# Patient Record
Sex: FEMALE | Race: ASIAN | NOT HISPANIC OR LATINO | Employment: PART TIME | URBAN - METROPOLITAN AREA
[De-identification: names, ages, dates, MRNs, and addresses within clinical notes are randomized per-mention and may not be internally consistent; named-entity substitution may affect disease eponyms.]

---

## 2022-02-07 ENCOUNTER — TELEMEDICINE (OUTPATIENT)
Dept: FAMILY MEDICINE CLINIC | Facility: TELEHEALTH | Age: 22
End: 2022-02-07

## 2022-02-07 ENCOUNTER — LAB (OUTPATIENT)
Dept: LAB | Facility: HOSPITAL | Age: 22
End: 2022-02-07

## 2022-02-07 DIAGNOSIS — Z20.822 SUSPECTED COVID-19 VIRUS INFECTION: Primary | ICD-10-CM

## 2022-02-07 DIAGNOSIS — Z20.822 SUSPECTED COVID-19 VIRUS INFECTION: ICD-10-CM

## 2022-02-07 LAB — SARS-COV-2 RNA NOSE QL NAA+PROBE: DETECTED

## 2022-02-07 PROCEDURE — U0004 COV-19 TEST NON-CDC HGH THRU: HCPCS

## 2022-02-07 PROCEDURE — BHEMPVIDEOVISIT: Performed by: NURSE PRACTITIONER

## 2022-02-07 NOTE — PROGRESS NOTES
Mode of Visit: Video  Location of patient: home  You have chosen to receive care through a telehealth visit.  The patient has signed the video visit consent form.  The visit included audio and video interaction. No technical issues occurred during this visit.     Chief Complaint  Exposure To Known Illness (Sister had rapid At-home positive), Chills (last night), Hoarse, and Cough    Subjective          Mila Lechuga presents to [facility]  [employer] Employee with symptoms of Covid has reported to screening and instructed to schedule a Virtual Visit for evaluation and Covid testing Order.   Symptom Onset: 2/6/2022    Chills  This is a new problem. The current episode started yesterday. Associated symptoms include chills, coughing and weakness.   Hoarse  This is a new problem. The current episode started yesterday. Associated symptoms include chills, coughing and weakness.   Cough  Associated symptoms include chills.     Objective   Vital Signs:   There were no vitals taken for this visit.    Virtual Visit Physical Exam  Result Review :                 Assessment and Plan {CC Problem List  Visit Diagnosis   ROS  Review (Popup)  Health Maintenance  Quality  BestPractice  Medications  SmartSets  SnapShot Encounters  Media :23}   Diagnoses and all orders for this visit:    1. Suspected COVID-19 virus infection (Primary)  -     COVID-19 PCR, [facility], NP SWAB IN [facility]SP[facility] MEDIA/ORAL SWISH 24-30 HR TAT - Swab, Nasopharynx; Future              I spent 20 minutes caring for Mila on this date of service. This time includes time spent by me in the following activities:preparing for the visit, obtaining and/or reviewing a separately obtained history, performing a medically appropriate examination and/or evaluation , counseling and educating the patient/family/caregiver, ordering medications, tests, or procedures, and documenting information in the medical record  Follow Up    No follow-ups on file.  Patient was given instructions and counseling regarding her condition or for health maintenance advice. Please see specific information pulled into the AVS if appropriate.

## 2022-02-07 NOTE — PATIENT INSTRUCTIONS
Order has been placed for SARS-CoV-2 (Coronavirus) test to be done at your nearest [facility] facility. You don't need to call the Urgent Care, just let them know when you arrive that you have been assessed by a Virtual Care Provider and that you have an order for testing. We will call with results when they are available. The results will be released to your RallyOn zachary. A RallyOn message will also be sent after the first attempted call to notify you that your test results are available. Continue to treat your symptoms just as you would for any viral illness. Take Tylenol and/or Ibuprofen for pain and/or fever, you may find it better to alternate these every 4 hours, so that you are only taking each every 8 hours. Stay hydrated, rest and you may treat cough with over-the-counter cough syrup such as Robitussin. You will need to Self Quarantine until test results are back. Follow EH guidelines.  [employer] Employees who need to report an exposure to Covid-19, symptoms, test results or to seek clearance to return to work, should use the Self-Reporting Form on [employer]. The link to this site is:   [email or web address]     This form is located on [email or web address] under [facility]. All instructions for employees including what to do next are on this site. Scan the QRS code with your phone to access the Employee Covid-19 Exposure Survey:Initial Report, Test Results and RTW zachary.   If you do not have the ability to scan the code, there is a link on the page to access the form.     Using the form instead of calling [employer] will speed up the process and provide employees with instructions. To avoid missed calls from [employer], add this number to your phone's Contact list: [phone].

## 2024-08-26 ENCOUNTER — HOSPITAL ENCOUNTER (EMERGENCY)
Facility: HOSPITAL | Age: 24
Discharge: HOME | End: 2024-08-26
Attending: EMERGENCY MEDICINE | Admitting: EMERGENCY MEDICINE
Payer: MEDICAID

## 2024-08-26 VITALS
WEIGHT: 95 LBS | TEMPERATURE: 99 F | HEART RATE: 78 BPM | OXYGEN SATURATION: 99 % | DIASTOLIC BLOOD PRESSURE: 65 MMHG | HEIGHT: 59 IN | BODY MASS INDEX: 19.15 KG/M2 | SYSTOLIC BLOOD PRESSURE: 108 MMHG | RESPIRATION RATE: 16 BRPM

## 2024-08-26 DIAGNOSIS — N64.52 BREAST DISCHARGE: Primary | ICD-10-CM

## 2024-08-26 LAB
ANION GAP SERPL CALCULATED.3IONS-SCNC: 9 MMOL/L (ref 5–15)
BASOPHILS # BLD AUTO: 0.03 10*3/MM3 (ref 0–0.2)
BASOPHILS NFR BLD AUTO: 0.6 % (ref 0–1.5)
BUN SERPL-MCNC: 20 MG/DL (ref 6–20)
BUN/CREAT SERPL: 23 (ref 7–25)
CALCIUM SPEC-SCNC: 9.1 MG/DL (ref 8.6–10.5)
CHLORIDE SERPL-SCNC: 105 MMOL/L (ref 98–107)
CO2 SERPL-SCNC: 24 MMOL/L (ref 22–29)
CREAT SERPL-MCNC: 0.87 MG/DL (ref 0.57–1)
D-LACTATE SERPL-SCNC: 1.3 MMOL/L (ref 0.5–2)
DEPRECATED RDW RBC AUTO: 39.6 FL (ref 37–54)
EGFRCR SERPLBLD CKD-EPI 2021: 96.1 ML/MIN/1.73
EOSINOPHIL # BLD AUTO: 0.15 10*3/MM3 (ref 0–0.4)
EOSINOPHIL NFR BLD AUTO: 3 % (ref 0.3–6.2)
ERYTHROCYTE [DISTWIDTH] IN BLOOD BY AUTOMATED COUNT: 12.2 % (ref 12.3–15.4)
GLUCOSE SERPL-MCNC: 88 MG/DL (ref 65–99)
HCT VFR BLD AUTO: 39.7 % (ref 34–46.6)
HGB BLD-MCNC: 12.8 G/DL (ref 12–15.9)
IMM GRANULOCYTES # BLD AUTO: 0.02 10*3/MM3 (ref 0–0.05)
IMM GRANULOCYTES NFR BLD AUTO: 0.4 % (ref 0–0.5)
LYMPHOCYTES # BLD AUTO: 1.74 10*3/MM3 (ref 0.7–3.1)
LYMPHOCYTES NFR BLD AUTO: 35.2 % (ref 19.6–45.3)
MCH RBC QN AUTO: 28.6 PG (ref 26.6–33)
MCHC RBC AUTO-ENTMCNC: 32.2 G/DL (ref 31.5–35.7)
MCV RBC AUTO: 88.6 FL (ref 79–97)
MONOCYTES # BLD AUTO: 0.38 10*3/MM3 (ref 0.1–0.9)
MONOCYTES NFR BLD AUTO: 7.7 % (ref 5–12)
NEUTROPHILS NFR BLD AUTO: 2.63 10*3/MM3 (ref 1.7–7)
NEUTROPHILS NFR BLD AUTO: 53.1 % (ref 42.7–76)
NRBC BLD AUTO-RTO: 0 /100 WBC (ref 0–0.2)
PLATELET # BLD AUTO: 211 10*3/MM3 (ref 140–450)
PMV BLD AUTO: 10.5 FL (ref 6–12)
POTASSIUM SERPL-SCNC: 3.7 MMOL/L (ref 3.5–5.2)
RBC # BLD AUTO: 4.48 10*6/MM3 (ref 3.77–5.28)
SODIUM SERPL-SCNC: 138 MMOL/L (ref 136–145)
WBC NRBC COR # BLD AUTO: 4.95 10*3/MM3 (ref 3.4–10.8)

## 2024-08-26 PROCEDURE — 85025 COMPLETE CBC W/AUTO DIFF WBC: CPT | Performed by: PHYSICIAN ASSISTANT

## 2024-08-26 PROCEDURE — 80048 BASIC METABOLIC PNL TOTAL CA: CPT | Performed by: PHYSICIAN ASSISTANT

## 2024-08-26 PROCEDURE — 83605 ASSAY OF LACTIC ACID: CPT | Performed by: PHYSICIAN ASSISTANT

## 2024-08-26 PROCEDURE — 36415 COLL VENOUS BLD VENIPUNCTURE: CPT

## 2024-08-26 PROCEDURE — 99283 EMERGENCY DEPT VISIT LOW MDM: CPT

## 2024-08-26 RX ORDER — CEPHALEXIN 250 MG/5ML
500 POWDER, FOR SUSPENSION ORAL 4 TIMES DAILY
COMMUNITY
Start: 2024-08-16 | End: 2024-08-30

## 2024-08-26 RX ORDER — PANTOPRAZOLE SODIUM 40 MG/1
40 TABLET, DELAYED RELEASE ORAL DAILY
COMMUNITY
Start: 2024-07-12

## 2024-08-26 NOTE — ED PROVIDER NOTES
EMERGENCY DEPARTMENT ENCOUNTER  Room Number:  41/41  PCP: Provider, No Known  Independent Historians: Patient      HPI:  Chief Complaint: had concerns including Wound Check and Breast Discharge.     A complete HPI/ROS/PMH/PSH/SH/FH are unobtainable due to: None    Chronic or social conditions impacting patient care (Social Determinants of Health): None      Context: Mila Lechuga is a 23 y.o. female with a medical history of GERD, thoracic scoliosis, who presents to the ED c/o acute left breast infection.  Patient reports she first developed an infection 2 years ago.  She reports she has had on and off infections.  She reports that her current infection started at the end of June.  She has seen her primary care provider for this infection.  She is scheduled to see a breast specialist in 1 month.  She was initially empirically placed on clindamycin and was switched to doxycycline.  She had wound culture that grew out Staph epidermidis.  She subsequently had breast ultrasound that ruled out lesion or abscess.  She recently followed up with a different primary provider and was placed on Keflex.  She was told if her symptoms did not improve she needed to go to the emergency department.  Patient denies fever, nausea, vomiting.  No right breast symptoms.  Patient is concerned that she has Paget's disease.  Patient has no other systemic complaints at this time.      Review of prior external notes (non-ED) -and- Review of prior external test results outside of this encounter:  Patient seen in office by PCP on 8/16/2024 for nipple infection.  Reviewed assessment and plan.  Patient prescribed Keflex 4 times daily for 14 days.    Prescription drug monitoring program review:     N/A    PAST MEDICAL HISTORY  Active Ambulatory Problems     Diagnosis Date Noted    No Active Ambulatory Problems     Resolved Ambulatory Problems     Diagnosis Date Noted    No Resolved Ambulatory Problems     No Additional Past Medical History          PAST SURGICAL HISTORY  History reviewed. No pertinent surgical history.      FAMILY HISTORY  History reviewed. No pertinent family history.      SOCIAL HISTORY  Social History     Socioeconomic History    Marital status: Single         ALLERGIES  Patient has no known allergies.      REVIEW OF SYSTEMS  Review of Systems   Constitutional:  Negative for chills and fever.   HENT:  Negative for ear pain and sore throat.    Respiratory:  Negative for cough and shortness of breath.    Cardiovascular:  Negative for chest pain and palpitations.   Gastrointestinal:  Negative for abdominal pain and vomiting.   Genitourinary:  Negative for dysuria and hematuria.   Musculoskeletal:  Negative for arthralgias and joint swelling.   Skin:  Negative for pallor and rash.        Positive for left breast infection   Neurological:  Negative for numbness and headaches.   Psychiatric/Behavioral:  Negative for confusion and hallucinations.      Included in HPI  All systems reviewed and negative except for those discussed in HPI.      PHYSICAL EXAM    I have reviewed the triage vital signs and nursing notes.    ED Triage Vitals   Temp Heart Rate Resp BP SpO2   08/26/24 1059 08/26/24 1059 08/26/24 1059 08/26/24 1101 08/26/24 1059   99 °F (37.2 °C) 114 16 122/82 96 %      Temp src Heart Rate Source Patient Position BP Location FiO2 (%)   -- -- -- -- --              Physical Exam  Constitutional:       General: She is not in acute distress.     Appearance: She is well-developed.   HENT:      Head: Normocephalic and atraumatic.   Eyes:      Extraocular Movements: Extraocular movements intact.   Cardiovascular:      Rate and Rhythm: Normal rate and regular rhythm.      Heart sounds: Normal heart sounds.   Pulmonary:      Effort: Pulmonary effort is normal.      Breath sounds: Normal breath sounds.   Chest:      Comments: Breast exam chaperoned by MUMTAZ Atkins.  There is no breast warmth or erythema.  There is very scant, if any, using  serous appearing drainage from the left nipple.  No palpable masses or fluctuance.  Abdominal:      General: There is no distension.   Skin:     General: Skin is warm.   Neurological:      General: No focal deficit present.      Mental Status: She is alert and oriented to person, place, and time.   Psychiatric:         Mood and Affect: Mood normal.           LAB RESULTS  Recent Results (from the past 24 hour(s))   Basic Metabolic Panel    Collection Time: 08/26/24 11:48 AM    Specimen: Blood   Result Value Ref Range    Glucose 88 65 - 99 mg/dL    BUN 20 6 - 20 mg/dL    Creatinine 0.87 0.57 - 1.00 mg/dL    Sodium 138 136 - 145 mmol/L    Potassium 3.7 3.5 - 5.2 mmol/L    Chloride 105 98 - 107 mmol/L    CO2 24.0 22.0 - 29.0 mmol/L    Calcium 9.1 8.6 - 10.5 mg/dL    BUN/Creatinine Ratio 23.0 7.0 - 25.0    Anion Gap 9.0 5.0 - 15.0 mmol/L    eGFR 96.1 >60.0 mL/min/1.73   Lactic Acid, Plasma    Collection Time: 08/26/24 11:48 AM    Specimen: Blood   Result Value Ref Range    Lactate 1.3 0.5 - 2.0 mmol/L   CBC Auto Differential    Collection Time: 08/26/24 11:48 AM    Specimen: Blood   Result Value Ref Range    WBC 4.95 3.40 - 10.80 10*3/mm3    RBC 4.48 3.77 - 5.28 10*6/mm3    Hemoglobin 12.8 12.0 - 15.9 g/dL    Hematocrit 39.7 34.0 - 46.6 %    MCV 88.6 79.0 - 97.0 fL    MCH 28.6 26.6 - 33.0 pg    MCHC 32.2 31.5 - 35.7 g/dL    RDW 12.2 (L) 12.3 - 15.4 %    RDW-SD 39.6 37.0 - 54.0 fl    MPV 10.5 6.0 - 12.0 fL    Platelets 211 140 - 450 10*3/mm3    Neutrophil % 53.1 42.7 - 76.0 %    Lymphocyte % 35.2 19.6 - 45.3 %    Monocyte % 7.7 5.0 - 12.0 %    Eosinophil % 3.0 0.3 - 6.2 %    Basophil % 0.6 0.0 - 1.5 %    Immature Grans % 0.4 0.0 - 0.5 %    Neutrophils, Absolute 2.63 1.70 - 7.00 10*3/mm3    Lymphocytes, Absolute 1.74 0.70 - 3.10 10*3/mm3    Monocytes, Absolute 0.38 0.10 - 0.90 10*3/mm3    Eosinophils, Absolute 0.15 0.00 - 0.40 10*3/mm3    Basophils, Absolute 0.03 0.00 - 0.20 10*3/mm3    Immature Grans, Absolute 0.02 0.00 -  0.05 10*3/mm3    nRBC 0.0 0.0 - 0.2 /100 WBC         ORDERS PLACED DURING THIS VISIT:  Orders Placed This Encounter   Procedures    Basic Metabolic Panel    Lactic Acid, Plasma    CBC Auto Differential    Ambulatory Referral to Breast Surgery    CBC & Differential         OUTPATIENT MEDICATION MANAGEMENT:  No current Epic-ordered facility-administered medications on file.     Current Outpatient Medications Ordered in Epic   Medication Sig Dispense Refill    cephALEXin (KEFLEX) 250 MG/5ML suspension Take 10 mL by mouth 4 (Four) Times a Day.      pantoprazole (PROTONIX) 40 MG EC tablet Take 1 tablet by mouth Daily.      fluticasone (VERAMYST) 27.5 MCG/SPRAY nasal spray 2 sprays into the nostril(s) as directed by provider Daily.             PROGRESS, DATA ANALYSIS, CONSULTS, AND MEDICAL DECISION MAKING  All labs have been independently interpreted by me.  All radiology studies have been reviewed by me. All EKG's have been independently viewed and interpreted by me.  Discussion below represents my analysis of pertinent findings related to patient's condition, differential diagnosis, treatment plan and final disposition.    Differential diagnosis includes but is not limited to hyperprolactinemia, cellulitis, breast abscess, sepsis.        ED Course as of 08/26/24 1904   Mon Aug 26, 2024   1234 WBC: 4.95 [MP]   1234 Hemoglobin: 12.8 [MP]   1234 BUN: 20 [MP]   1234 Creatinine: 0.87 [MP]   1234 Lactate: 1.3 [MP]   1903 Patient presents to emergency department with concern for left nipple infection.  Worked up with labs, does not have elevated white blood cell count or lactic acid, lowers concern for sepsis.  Patient is scheduled to follow-up with the breast specialist in 1 month.  Discussed ED return precautions.  She is otherwise well-appearing, hemodynamically stable, and therefore appropriate for discharge. [MP]      ED Course User Index  [MP] Brandy Horta PA-C             AS OF 19:02 EDT VITALS:    BP - 108/65  HR  - 78  TEMP - 99 °F (37.2 °C)  O2 SATS - 99%    COMPLEXITY OF CARE  Admission was considered but after careful review of the patient's presentation, physical examination, diagnostic results, and response to treatment the patient may be safely discharged with outpatient follow-up.      DIAGNOSIS  Final diagnoses:   Breast discharge         DISPOSITION  ED Disposition       ED Disposition   Discharge    Condition   Stable    Comment   --                Please note that portions of this document were completed with a voice recognition program.    Note Disclaimer: At [facility], we believe that sharing information builds trust and better relationships. You are receiving this note because you recently visited [facility]. It is possible you will see health information before a provider has talked with you about it. This kind of information can be easy to misunderstand. To help you fully understand what it means for your health, we urge you to discuss this note with your provider.         Brandy Horta PA-C  08/26/24 190

## 2024-08-26 NOTE — ED PROVIDER NOTES
MD ATTESTATION NOTE    SHARED VISIT: This visit was performed by BOTH a physician and an APC. The substantive portion of the medical decision making was performed by this attesting physician who made or approved the management plan and takes responsibility for patient management. All studies in the APC note (if performed) were independently interpreted by me.     The TICO and I have discussed this patient's history, physical exam, and treatment plan.  I have reviewed the documentation and affirm the documentation and agree with the treatment and plan.  The attached note describes my personal findings.      Independent Historians: Patient    A complete HPI/ROS/PMH/PSH/SH/FH are unobtainable due to: None      Chronic or social conditions impacting patient care (social determinants of health): None    Mila Lechuga is a 23 y.o. female who presents to the ED c/o acute left breast infection.  Patient with recurrent infections off and on antibiotics.  Patient said she started with some drainage and tenderness at the end of June.  Patient scheduled to see a breast specialist at  in 1 month.  Patient empirically on clindamycin and then switched to doxycycline.  Patient with no fever or vomiting.          On exam:  GENERAL: Pleasant cooperative well-appearing female, alert, no acute distress  SKIN: Warm, dry, left breast with no warmth or erythema, very small amount of serous drainage from the left nipple, no mass palpable  HENT: Normocephalic, atraumatic                                                             Labs  Recent Results (from the past 24 hour(s))   CBC Auto Differential    Collection Time: 08/26/24 11:48 AM    Specimen: Blood   Result Value Ref Range    WBC 4.95 3.40 - 10.80 10*3/mm3    RBC 4.48 3.77 - 5.28 10*6/mm3    Hemoglobin 12.8 12.0 - 15.9 g/dL    Hematocrit 39.7 34.0 - 46.6 %    MCV 88.6 79.0 - 97.0 fL    MCH 28.6 26.6 - 33.0 pg    MCHC 32.2 31.5 - 35.7 g/dL    RDW 12.2 (L) 12.3 - 15.4 %    RDW-SD 39.6  37.0 - 54.0 fl    MPV 10.5 6.0 - 12.0 fL    Platelets 211 140 - 450 10*3/mm3    Neutrophil % 53.1 42.7 - 76.0 %    Lymphocyte % 35.2 19.6 - 45.3 %    Monocyte % 7.7 5.0 - 12.0 %    Eosinophil % 3.0 0.3 - 6.2 %    Basophil % 0.6 0.0 - 1.5 %    Immature Grans % 0.4 0.0 - 0.5 %    Neutrophils, Absolute 2.63 1.70 - 7.00 10*3/mm3    Lymphocytes, Absolute 1.74 0.70 - 3.10 10*3/mm3    Monocytes, Absolute 0.38 0.10 - 0.90 10*3/mm3    Eosinophils, Absolute 0.15 0.00 - 0.40 10*3/mm3    Basophils, Absolute 0.03 0.00 - 0.20 10*3/mm3    Immature Grans, Absolute 0.02 0.00 - 0.05 10*3/mm3    nRBC 0.0 0.0 - 0.2 /100 WBC       Radiology  No Radiology Exams Resulted Within Past 24 Hours    Medical Decision Making:  ED Course as of 08/29/24 1515   Mon Aug 26, 2024   1234 WBC: 4.95 [MP]   1234 Hemoglobin: 12.8 [MP]   1234 BUN: 20 [MP]   1234 Creatinine: 0.87 [MP]   1234 Lactate: 1.3 [MP]   1903 Patient presents to emergency department with concern for left nipple infection.  Worked up with labs, does not have elevated white blood cell count or lactic acid, lowers concern for sepsis.  Patient is scheduled to follow-up with the breast specialist in 1 month.  Discussed ED return precautions.  She is otherwise well-appearing, hemodynamically stable, and therefore appropriate for discharge. [MP]      ED Course User Index  [MP] Brandy Horta PA-C       MDM: Abscess, cellulitis, mass, seroma, malignancy--among other possibilities.  Patient already established with a breast specialist.    Procedures:  Procedures        PPE: I followed hospital protocols for proper PPE based on patient presentation including use of N95 mask for suspected infectious respiratory conditions.  Proper hand hygiene was performed both before and after the patient encounter.          Diagnosis  Final diagnoses:   None   Breast nipple discharge    Dispo: Discharge    Note Disclaimer: At [facility], we believe that sharing information builds trust and better  relationships. You are receiving this note because you recently visited [facility]. It is possible you will see health information before a provider has talked with you about it. This kind of information can be easy to misunderstand. To help you fully understand what it means for your health, we urge you to discuss this note with your provider.       Yanet Walton MD  08/29/24 [id number]

## 2024-08-26 NOTE — DISCHARGE INSTRUCTIONS
Follow-up with your primary care provider.  I have placed a referral to breast surgery for follow-up of your nipple discharge.  Return to emergency department for any worsening symptoms.

## 2024-08-27 ENCOUNTER — TELEPHONE (OUTPATIENT)
Dept: SURGERY | Facility: CLINIC | Age: 24
End: 2024-08-27
Payer: MEDICAID

## 2024-09-06 ENCOUNTER — TELEPHONE (OUTPATIENT)
Dept: SURGERY | Facility: CLINIC | Age: 24
End: 2024-09-06
Payer: MEDICAID

## 2024-09-06 NOTE — TELEPHONE ENCOUNTER
Called and spoke with patient asked if she is going to the [facility] or would still like to be seen in our office. She would like to be seen by us and also [facility] depending on location she lives in [place] but is also seen at .     [place]    
Called pt to schedule, she was busy and requested to call back to make an appointment/ [place]  
no

## 2024-09-11 ENCOUNTER — OFFICE VISIT (OUTPATIENT)
Dept: SURGERY | Facility: CLINIC | Age: 24
End: 2024-09-11
Payer: MEDICAID

## 2024-09-11 VITALS
SYSTOLIC BLOOD PRESSURE: 124 MMHG | HEART RATE: 83 BPM | WEIGHT: 96 LBS | RESPIRATION RATE: 18 BRPM | BODY MASS INDEX: 19.35 KG/M2 | OXYGEN SATURATION: 98 % | HEIGHT: 59 IN | DIASTOLIC BLOOD PRESSURE: 82 MMHG

## 2024-09-11 DIAGNOSIS — N64.9 ABNORMAL NIPPLE: Primary | ICD-10-CM

## 2024-09-11 PROCEDURE — 99204 OFFICE O/P NEW MOD 45 MIN: CPT | Performed by: STUDENT IN AN ORGANIZED HEALTH CARE EDUCATION/TRAINING PROGRAM

## 2024-09-11 PROCEDURE — 1160F RVW MEDS BY RX/DR IN RCRD: CPT | Performed by: STUDENT IN AN ORGANIZED HEALTH CARE EDUCATION/TRAINING PROGRAM

## 2024-09-11 PROCEDURE — 1159F MED LIST DOCD IN RCRD: CPT | Performed by: STUDENT IN AN ORGANIZED HEALTH CARE EDUCATION/TRAINING PROGRAM

## 2024-09-11 NOTE — PROGRESS NOTES
[facility]     Referring Provider: Brandy Horta PA-C     Chief complaint:  Left nipple abnormality     Subjective   HPI: Ms. Mila Lechuga is a 23 y.o. yo woman, seen at the request of Brandy Horta PA-C, for a new diagnosis of left nipple abnormality.      Approximately 2 years ago she noted a change to her left lateral nipple.  There is an area of redness and skin ulceration in that area.  She saw multiple providers and was provided several rounds of antibiotics as well as antibiotic ointment.  There was no resolution of her symptoms.  The ulcerated area on her left nipple areolar junction waxed and waned in severity.  Over the summer she has noticed ongoing worsening pain and drainage from her left lateral nipple base.  She has gone to the emergency room for evaluation and briefly followed with wound care.  She has been applying lanolin and Xeroform gauze dressing.     She reports a pertinent PMH significant for allergies, anemia, benign tachycardia, reflux.     She denies any family history of breast or ovarian cancer.     She was by herself in clinic today.       Review of Systems   Constitutional:  Positive for appetite change (Increase) and unexpected weight change (2lbs in a month).   HENT:   Positive for lump/mass, mouth sores and tinnitus.    Eyes: Negative.    Respiratory:  Positive for shortness of breath.    Cardiovascular: Negative.    Gastrointestinal:  Positive for constipation.   Endocrine: Positive for hot flashes (too cold).   Genitourinary:  Positive for dyspareunia.    Musculoskeletal:  Positive for back pain.   Skin: Negative.    Neurological: Negative.    Hematological: Negative.    Psychiatric/Behavioral:  Positive for decreased concentration. The patient is nervous/anxious.        Medications:    Current Outpatient Medications:     fluticasone (VERAMYST) 27.5 MCG/SPRAY nasal spray, 2 sprays into the nostril(s) as directed by provider Daily., Disp: , Rfl:     pantoprazole  "(PROTONIX) 40 MG EC tablet, Take 1 tablet by mouth Daily., Disp: , Rfl:     Allergies:  Allergies   Allergen Reactions    Apple Juice Itching and Swelling     Apples    Cherry Itching and Swelling    Pear Itching and Swelling    Prunus Persica Itching and Swelling    Soy Allergy Itching    Strawberry Itching    Apple Unknown - Low Severity       Medical history:  Past Medical History:   Diagnosis Date    Anemia     Tachycardia     TMJ (dislocation of temporomandibular joint)        Surgical History:  Past Surgical History:   Procedure Laterality Date    CYST REMOVAL         Family History:  History reviewed. No pertinent family history.    Cancer Family History: Age of diagnosis/Age at death OR Age as of today  Breast Cancer: Denies  Ovarian Cancer: Denies  Pancreatic Cancer: Denies  Prostate Cancer: Denies  Colon Cancer: Denies  Lung Cancer: Denies    Social History:   Social History     Socioeconomic History    Marital status: Single   Tobacco Use    Smoking status: Never   Vaping Use    Vaping status: Never Used   Substance and Sexual Activity    Drug use: Never       She lives with her roommate.   She is a full time student.        GYNECOLOGIC HISTORY:   . P: 0. AB: 0.  Last menstrual period: 2024  Age at menarche: 12  Age at first childbirth: N/A  Lactation: N/A  Age at menopause: N/A  Total years of oral contraceptive use: Couple months   Total years of hormone replacement therapy: 0      Objective   PHYSICAL EXAMINATION  This exam was chaperoned by: Apolonia  /82   Pulse 83   Resp 18   Ht 149.9 cm (59.02\")   Wt 43.5 kg (96 lb)   LMP 2024 (Approximate)   SpO2 98%   BMI 19.38 kg/m²   ECOG 0 - Asymptomatic  General: NAD, well appearing, thin build  Psych: a&o x 3, normal mood and affect  Eyes: EOMI, no scleral icterus  ENMT: neck supple without masses or thyromegaly, mucus membranes moist  Resp: normal effort  CV: RRR, no edema   GI: soft, NT, ND  MSK: normal gait, normal ROM in bilateral " shoulders  Lymph nodes:  no cervical, supraclavicular or axillary lymphadenopathy  Breast: symmetric, small breast volume bilaterally  Right:  No visible abnormalities on inspection while seated, with arms raised or hands on hips. No masses, skin changes, or nipple abnormalities.  Left: No visible abnormalities on inspection while seated, with arms raised or hands on hips. No masses, skin changes.  Nipple papilla is larger than the right and inflamed.  Has lateral redness along the nipple.  Junction between nipple and areolar complex with crusting clear serous drainage.  No visualized granulation tissue or wound that is visible.        Imaging - documented images below have been personally reviewed:  8/2/2024 left breast ultrasound-[facility]  Focused ultrasound performed at the subareolar region around the clock demonstrates breast parenchyma without suspicious cystic or solid mass. No organized fluid collection. Comparison sonographic evaluation of the contralateral (right) nipple shows no significant disparity in tissue echogenicity. The left nipple is slightly more prominent when compared with the right and demonstrates some vascularity which could be related to infection/inflammation.   BI-RADS 3.     Pathology:  na    Assessment & Plan   Assessment:  23 y.o. F with a new diagnosis of left nipple    Discussion:  Breast imaging thus far with ultrasound has not identified an underlying etiology of her left nipple changes.    Plan:    -Recommend completion workup of her left nipple changes with breast MRI.  If breast MRI does not show any underlying abnormalities would recommend punch biopsy of the skin.  - Offered to complete this procedure during this visit however patient has a test tomorrow and declined biopsy today.  - Return to clinic after imaging complete or in 4 weeks to discuss need or role of punch biopsy      Dawn Herr MD  Breast Surgical Oncology    I spent 45 minutes caring for Mila on this  date of service. This time includes time spent by me in the following activities: preparing for the visit, reviewing tests, obtaining and/or reviewing a separately obtained history, performing a medically appropriate examination and/or evaluation , counseling and educating the patient/family/caregiver, ordering medications, tests, or procedures, referring and communicating with other health care professionals , documenting information in the medical record, independently interpreting results and communicating that information with the patient/family/caregiver, and care coordination.      CC:  Brandy Horta, Zulay Stevens MD

## 2024-09-11 NOTE — LETTER
September 11, 2024     Zulay Mac MD  [address]  [address]  [facility] [address]    Patient: Mila Lechuga   YOB: 2000   Date of Visit: 9/11/2024     Dear Zulay Mac MD:       Thank you for referring Mila Lechuga to me for evaluation. Below are the relevant portions of my assessment and plan of care.    If you have questions, please do not hesitate to call me. I look forward to following Mila along with you.         Sincerely,        Dawn Herr MD        CC: MODE Lafleur Sarah, MD  09/11/24 1618  Sign when Signing Visit  [facility]     Referring Provider: Brandy Horta PA-C     Chief complaint:  Left nipple abnormality     Subjective  HPI: Ms. Mila Lechuga is a 23 y.o. yo woman, seen at the request of Brandy Horta PA-C, for a new diagnosis of left nipple abnormality.      Approximately 2 years ago she noted a change to her left lateral nipple.  There is an area of redness and skin ulceration in that area.  She saw multiple providers and was provided several rounds of antibiotics as well as antibiotic ointment.  There was no resolution of her symptoms.  The ulcerated area on her left nipple areolar junction waxed and waned in severity.  Over the summer she has noticed ongoing worsening pain and drainage from her left lateral nipple base.  She has gone to the emergency room for evaluation and briefly followed with wound care.  She has been applying lanolin and Xeroform gauze dressing.     She reports a pertinent PMH significant for allergies, anemia, benign tachycardia, reflux.     She denies any family history of breast or ovarian cancer.     She was by herself in clinic today.       Review of Systems   Constitutional:  Positive for appetite change (Increase) and unexpected weight change (2lbs in a month).   HENT:   Positive for lump/mass, mouth sores and tinnitus.    Eyes: Negative.    Respiratory:  Positive for  shortness of breath.    Cardiovascular: Negative.    Gastrointestinal:  Positive for constipation.   Endocrine: Positive for hot flashes (too cold).   Genitourinary:  Positive for dyspareunia.    Musculoskeletal:  Positive for back pain.   Skin: Negative.    Neurological: Negative.    Hematological: Negative.    Psychiatric/Behavioral:  Positive for decreased concentration. The patient is nervous/anxious.        Medications:    Current Outpatient Medications:   •  fluticasone (VERAMYST) 27.5 MCG/SPRAY nasal spray, 2 sprays into the nostril(s) as directed by provider Daily., Disp: , Rfl:   •  pantoprazole (PROTONIX) 40 MG EC tablet, Take 1 tablet by mouth Daily., Disp: , Rfl:     Allergies:  Allergies   Allergen Reactions   • Apple Juice Itching and Swelling     Apples   • Cherry Itching and Swelling   • Pear Itching and Swelling   • Prunus Persica Itching and Swelling   • Soy Allergy Itching   • Strawberry Itching   • Apple Unknown - Low Severity       Medical history:  Past Medical History:   Diagnosis Date   • Anemia    • Tachycardia    • TMJ (dislocation of temporomandibular joint)        Surgical History:  Past Surgical History:   Procedure Laterality Date   • CYST REMOVAL         Family History:  History reviewed. No pertinent family history.    Cancer Family History: Age of diagnosis/Age at death OR Age as of today  Breast Cancer: Denies  Ovarian Cancer: Denies  Pancreatic Cancer: Denies  Prostate Cancer: Denies  Colon Cancer: Denies  Lung Cancer: Denies    Social History:   Social History     Socioeconomic History   • Marital status: Single   Tobacco Use   • Smoking status: Never   Vaping Use   • Vaping status: Never Used   Substance and Sexual Activity   • Drug use: Never       She lives with her roommate.   She is a full time student.        GYNECOLOGIC HISTORY:   . P: 0. AB: 0.  Last menstrual period: 2024  Age at menarche: 12  Age at first childbirth: N/A  Lactation: N/A  Age at menopause:  "N/A  Total years of oral contraceptive use: Couple months   Total years of hormone replacement therapy: 0      Objective  PHYSICAL EXAMINATION  This exam was chaperoned by: Apolonia  /82   Pulse 83   Resp 18   Ht 149.9 cm (59.02\")   Wt 43.5 kg (96 lb)   LMP 08/13/2024 (Approximate)   SpO2 98%   BMI 19.38 kg/m²   ECOG 0 - Asymptomatic  General: NAD, well appearing, thin build  Psych: a&o x 3, normal mood and affect  Eyes: EOMI, no scleral icterus  ENMT: neck supple without masses or thyromegaly, mucus membranes moist  Resp: normal effort  CV: RRR, no edema   GI: soft, NT, ND  MSK: normal gait, normal ROM in bilateral shoulders  Lymph nodes:  no cervical, supraclavicular or axillary lymphadenopathy  Breast: symmetric, small breast volume bilaterally  Right:  No visible abnormalities on inspection while seated, with arms raised or hands on hips. No masses, skin changes, or nipple abnormalities.  Left: No visible abnormalities on inspection while seated, with arms raised or hands on hips. No masses, skin changes.  Nipple papilla is larger than the right and inflamed.  Has lateral redness along the nipple.  Junction between nipple and areolar complex with crusting clear serous drainage.  No visualized granulation tissue or wound that is visible.        Imaging - documented images below have been personally reviewed:  8/2/2024 left breast ultrasound-[facility]  Focused ultrasound performed at the subareolar region around the clock demonstrates breast parenchyma without suspicious cystic or solid mass. No organized fluid collection. Comparison sonographic evaluation of the contralateral (right) nipple shows no significant disparity in tissue echogenicity. The left nipple is slightly more prominent when compared with the right and demonstrates some vascularity which could be related to infection/inflammation.   BI-RADS 3.     Pathology:  na    Assessment & Plan  Assessment:  23 y.o. F with a new diagnosis of left " nipple    Discussion:  Breast imaging thus far with ultrasound has not identified an underlying etiology of her left nipple changes.    Plan:    -Recommend completion workup of her left nipple changes with breast MRI.  If breast MRI does not show any underlying abnormalities would recommend punch biopsy of the skin.  - Offered to complete this procedure during this visit however patient has a test tomorrow and declined biopsy today.  - Return to clinic after imaging complete or in 4 weeks to discuss need or role of punch biopsy      Dawn Herr MD  Breast Surgical Oncology    I spent 45 minutes caring for Mila on this date of service. This time includes time spent by me in the following activities: preparing for the visit, reviewing tests, obtaining and/or reviewing a separately obtained history, performing a medically appropriate examination and/or evaluation , counseling and educating the patient/family/caregiver, ordering medications, tests, or procedures, referring and communicating with other health care professionals , documenting information in the medical record, independently interpreting results and communicating that information with the patient/family/caregiver, and care coordination.      CC:  Brandy Horta, MODE Mac, Zulay Arroyo MD

## 2024-09-17 ENCOUNTER — TELEPHONE (OUTPATIENT)
Dept: SURGERY | Facility: CLINIC | Age: 24
End: 2024-09-17
Payer: MEDICAID

## 2024-09-26 ENCOUNTER — TELEPHONE (OUTPATIENT)
Dept: SURGERY | Facility: CLINIC | Age: 24
End: 2024-09-26
Payer: MEDICAID

## 2024-10-03 ENCOUNTER — OFFICE VISIT (OUTPATIENT)
Dept: ENDOCRINOLOGY | Age: 24
End: 2024-10-03
Payer: MEDICAID

## 2024-10-03 VITALS
SYSTOLIC BLOOD PRESSURE: 114 MMHG | OXYGEN SATURATION: 97 % | HEART RATE: 75 BPM | BODY MASS INDEX: 19.44 KG/M2 | DIASTOLIC BLOOD PRESSURE: 60 MMHG | WEIGHT: 96.4 LBS | HEIGHT: 59 IN

## 2024-10-03 DIAGNOSIS — R79.89 PROLACTIN INCREASED: Primary | ICD-10-CM

## 2024-10-03 RX ORDER — ETONOGESTREL AND ETHINYL ESTRADIOL .015; .12 MG/D; MG/D
1 INSERT, EXTENDED RELEASE VAGINAL
COMMUNITY

## 2024-10-03 RX ORDER — CABERGOLINE 0.5 MG/1
0.25 TABLET ORAL 2 TIMES WEEKLY
COMMUNITY

## 2024-10-03 RX ORDER — PROPRANOLOL HCL 20 MG
20 TABLET ORAL 2 TIMES DAILY
COMMUNITY

## 2024-10-03 RX ORDER — AZELAIC ACID 0.15 G/G
1 GEL TOPICAL 2 TIMES DAILY
COMMUNITY
Start: 2024-09-16

## 2024-10-03 RX ORDER — HYDROCORTISONE 25 MG/G
1 CREAM TOPICAL 2 TIMES DAILY
COMMUNITY
Start: 2024-07-12

## 2024-10-03 RX ORDER — FAMOTIDINE 10 MG
20 TABLET ORAL DAILY
COMMUNITY
Start: 2024-09-15 | End: 2025-09-15

## 2024-10-03 RX ORDER — MUPIROCIN 20 MG/G
1 OINTMENT TOPICAL DAILY
COMMUNITY

## 2024-10-03 RX ORDER — WHEAT DEXTRIN 3 G/3.8 G
1 POWDER (GRAM) ORAL DAILY
COMMUNITY
Start: 2024-05-09

## 2024-10-03 NOTE — PROGRESS NOTES
Chief complaint   No chief complaint on file.         Subjective     History of Present Illness:      This is a 23 years old female I am seeing for high prolactin   referred by PCP     other medical issues   1- GERD   2- Anemia   3- Other   Pt was Dx with high prolactin  many years ago , Dx was at age of 17  Is not on prolactin lowering medication now  , was on Cabergoline on and off for last 6 years  had blood work done by PCP, 5/2024  TSH was 2.2, prolactin was 34 in 8-2024    Last MRI of the pit was in 2021 that showed Heterogeneously enhancing pituitary gland without convincing adenoma. Previously described tiny lesion in the left gland appears resolved.   pt states that she has no headaches, No visual changes, has breast secretions on the left side  yellow and periods are regular and no plans for pregnancy , no history head and Neck radiation  No History of pituitary surgery, No family history of pituitary disease, No prior history of pituitary Dysfunction  and has No dysphagia, No dyspnea, No dysphonia, No change size of neck, No neck pain or discomfort, No nervousness, No shakiness, No palpitations, Weight stable   BM daily, No diarrhea, No constipation, No edema, No proximal muscle weak, No Cold or Heat Intolerance, No Insomnia, No hair Loss, No Skin Drynress, Appetite is OK          No family history on file.  Social History     Socioeconomic History    Marital status: Single   Tobacco Use    Smoking status: Never   Vaping Use    Vaping status: Never Used   Substance and Sexual Activity    Drug use: Never     Past Medical History:   Diagnosis Date    Anemia     Tachycardia     TMJ (dislocation of temporomandibular joint)      Past Surgical History:   Procedure Laterality Date    CYST REMOVAL         Current Outpatient Medications:     fluticasone (VERAMYST) 27.5 MCG/SPRAY nasal spray, 2 sprays into the nostril(s) as directed by provider Daily., Disp: , Rfl:     pantoprazole (PROTONIX) 40 MG EC tablet,  Take 1 tablet by mouth Daily., Disp: , Rfl:   Apple juice, Cherry, Pear, Prunus persica, Soy allergy, Strawberry, and Apple    Objective   There were no vitals filed for this visit.       Physical Exam  Neurological:      General: No focal deficit present.      Mental Status: She is alert and oriented to person, place, and time.               There are no diagnoses linked to this encounter.     Assessment:     This is a 23 years old female I am seeing for high prolactin, referred by PCP     Pt was Dx with high prolactin  many years ago   Is not on prolactin lowering medication now    had blood work done by PCP, 5/2024  TSH was 2.2, prolactin was 34 in 8-2024    Last MRI of the pit was in 2021 that showed Heterogeneously enhancing pituitary gland without convincing adenoma. Previously described tiny lesion in the left gland appears resolved.   Will reevaluate       Plan:    1. No treatment now as further tests are needed   Was told that one side secretion of the breast might not be related to prolactin   2. Labs today :Pregnancy test, Prolactin and IGF-1      3. If Prolactin is high will get an MRI of the pituitary gland  4. Return in 2 months   5. Was informed that will start Cabergoline if levels are elevated   6. Labs reviewed with the Patient : TSH   7- I reviewed the notes from PCP   8- Needs to call if she gets pregnant as we talked about pregnancy and prolactin         I discussed with the patient/legal representative the risks and the benefits associated with the medications.  The patient/legal representative has been given the opportunity to ask questions.  Alternatives to the proposed treatment (s) were discussed, including the likely results of no treatment.  The patient/legal representative wishes to proceed.       Pedro Grant MD   10/03/24  15:05 EDT

## 2024-10-04 LAB
B-HCG SERPL QL: NEGATIVE
PROLACTIN SERPL-MCNC: 5.6 NG/ML (ref 4.8–33.4)

## 2024-12-03 ENCOUNTER — OFFICE VISIT (OUTPATIENT)
Dept: ENDOCRINOLOGY | Age: 24
End: 2024-12-03
Payer: MEDICAID

## 2024-12-03 VITALS
HEART RATE: 77 BPM | SYSTOLIC BLOOD PRESSURE: 118 MMHG | WEIGHT: 99.8 LBS | HEIGHT: 59 IN | OXYGEN SATURATION: 100 % | TEMPERATURE: 98 F | DIASTOLIC BLOOD PRESSURE: 74 MMHG | BODY MASS INDEX: 20.12 KG/M2

## 2024-12-03 DIAGNOSIS — R79.89 PROLACTIN INCREASED: Primary | ICD-10-CM

## 2024-12-03 PROCEDURE — 1159F MED LIST DOCD IN RCRD: CPT | Performed by: INTERNAL MEDICINE

## 2024-12-03 PROCEDURE — 1160F RVW MEDS BY RX/DR IN RCRD: CPT | Performed by: INTERNAL MEDICINE

## 2024-12-03 PROCEDURE — 99214 OFFICE O/P EST MOD 30 MIN: CPT | Performed by: INTERNAL MEDICINE

## 2024-12-03 RX ORDER — POLYETHYLENE GLYCOL 3350 17 G/17G
17 POWDER, FOR SOLUTION ORAL 2 TIMES DAILY
COMMUNITY
Start: 2024-12-02

## 2024-12-03 NOTE — PROGRESS NOTES
Chief complaint   No chief complaint on file.         Subjective     History of Present Illness:      This is a 23 years old female I am seeing for high prolactin   referred by PCP   Last OV was 2 months ago   She is here for a follow up , came in late   other medical issues   1- GERD   2- Anemia   3- Other   Pt was Dx with high prolactin  many years ago , Dx was at age of 17  Is not on prolactin lowering medication now  , was on Cabergoline on and off for last 6 years  had blood work done by PCP, 5/2024  TSH was 2.2, prolactin was 34 in 8-2024    Last MRI of the pit was in 2021 that showed Heterogeneously enhancing pituitary gland without convincing adenoma. Previously described tiny lesion in the left gland appears resolved.   pt states that she has no headaches, No visual changes, has breast secretions on the left side  yellow and periods are regular and no plans for pregnancy , no history head and Neck radiation  No History of pituitary surgery, No family history of pituitary disease, No prior history of pituitary Dysfunction  and has No dysphagia, No dyspnea, No dysphonia, No change size of neck, No neck pain or discomfort, No nervousness, No shakiness, No palpitations, Weight stable   BM daily, No diarrhea, No constipation, No edema, No proximal muscle weak, No Cold or Heat Intolerance, No Insomnia, No hair Loss, No Skin Drynress, Appetite is OK   Latest Reference Range & Units 10/03/24 15:52   Prolactin 4.8 - 33.4 ng/mL 5.6             History reviewed. No pertinent family history.  Social History     Socioeconomic History    Marital status: Single   Tobacco Use    Smoking status: Never    Smokeless tobacco: Never   Vaping Use    Vaping status: Never Used   Substance and Sexual Activity    Drug use: Never     Past Medical History:   Diagnosis Date    Anemia     Tachycardia     TMJ (dislocation of temporomandibular joint)      Past Surgical History:   Procedure Laterality Date    CYST REMOVAL         Current  Outpatient Medications:     azelaic acid (AZELEX) 15 % gel, Apply 1 Application topically to the appropriate area as directed 2 (Two) Times a Day., Disp: , Rfl:     famotidine (PEPCID) 10 MG tablet, Take 2 tablets by mouth Daily., Disp: , Rfl:     fluticasone (VERAMYST) 27.5 MCG/SPRAY nasal spray, Administer 2 sprays into the nostril(s) as directed by provider Daily., Disp: , Rfl:     Haloette 0.12-0.015 MG/24HR vaginal ring, Insert 1 each into the vagina Every 28 (Twenty-Eight) Days., Disp: , Rfl:     Hydrocortisone, Perianal, (ANUSOL-HC) 2.5 % rectal cream, Insert 1 Application into the rectum 2 (Two) Times a Day., Disp: , Rfl:     pantoprazole (PROTONIX) 40 MG EC tablet, Take 1 tablet by mouth Daily., Disp: , Rfl:     polyethylene glycol (MIRALAX) 17 GM/SCOOP powder, Take 17 g by mouth 2 (Two) Times a Day., Disp: , Rfl:     propranolol (INDERAL) 20 MG tablet, Take 1 tablet by mouth 2 (Two) Times a Day., Disp: , Rfl:     Wheat Dextrin (Benefiber) powder, Take 1 each by mouth Daily., Disp: , Rfl:     cabergoline (DOSTINEX) 0.5 MG tablet, Take 0.5 tablets by mouth 2 (Two) Times a Week. (Patient not taking: Reported on 12/3/2024), Disp: , Rfl:     mupirocin (BACTROBAN) 2 % ointment, Apply 1 Application topically to the appropriate area as directed Daily. (Patient not taking: Reported on 12/3/2024), Disp: , Rfl:   Apple juice, Cherry, Pear, Prunus persica, Soy allergy, Strawberry, and Apple    Objective   Vitals:    12/03/24 1541   BP: 118/74   Pulse: 77   Temp: 98 °F (36.7 °C)   SpO2: 100%          Physical Exam  Neurological:      General: No focal deficit present.      Mental Status: She is alert and oriented to person, place, and time.               Diagnoses and all orders for this visit:    1. Prolactin increased (Primary)         Assessment:     This is a 23 years old female I am seeing for high prolactin, referred by PCP     Pt was Dx with high prolactin  many years ago   Is not on prolactin lowering  medication now    had blood work done by PCP, 5/2024  TSH was 2.2, prolactin was 34 in 8-2024    Last MRI of the pit was in 2021 that showed Heterogeneously enhancing pituitary gland without convincing adenoma. Previously described tiny lesion in the left gland appears resolved. Will reevaluate       Plan:    1. No treatment now as further tests are needed   Was told that one side secretion of the breast might not be related to prolactin   2. Labs today :Pregnancy test, Prolactin and IGF-1      3. If Prolactin is high will get an MRI of the pituitary gland  4. Return in 4 months   5. Was informed that will start Cabergoline if levels are elevated   6. Labs reviewed with the Patient : TSH   7- I reviewed the notes from PCP   8- Needs to call if she gets pregnant as we talked about pregnancy and prolactin         I discussed with the patient/legal representative the risks and the benefits associated with the medications.  The patient/legal representative has been given the opportunity to ask questions.  Alternatives to the proposed treatment (s) were discussed, including the likely results of no treatment.  The patient/legal representative wishes to proceed.       Pedro Grant MD   12/03/24  15:47 EST

## 2024-12-05 LAB
IGF-I SERPL-MCNC: 193 NG/ML (ref 101–347)
IGF-I Z-SCORE SERPL: -0.3 S.D.
PROLACTIN SERPL-MCNC: 27.9 NG/ML (ref 4.8–33.4)
TSH SERPL DL<=0.005 MIU/L-ACNC: 2.27 UIU/ML (ref 0.27–4.2)

## 2025-03-04 ENCOUNTER — OFFICE VISIT (OUTPATIENT)
Dept: SURGERY | Facility: CLINIC | Age: 25
End: 2025-03-04
Payer: MEDICAID

## 2025-03-04 VITALS — HEART RATE: 108 BPM | DIASTOLIC BLOOD PRESSURE: 58 MMHG | SYSTOLIC BLOOD PRESSURE: 108 MMHG | OXYGEN SATURATION: 98 %

## 2025-03-04 DIAGNOSIS — L30.9 ECZEMA, UNSPECIFIED TYPE: ICD-10-CM

## 2025-03-04 DIAGNOSIS — N64.9 ABNORMAL NIPPLE: Primary | ICD-10-CM

## 2025-03-04 NOTE — LETTER
March 5, 2025     Zulay Mac MD  [address]  [address]  [facility] [address]    Patient: Mila Lechuga   YOB: 2000   Date of Visit: 3/4/2025     Dear Zulay Mac MD:       Thank you for referring Mila Lechuga to me for evaluation. Below are the relevant portions of my assessment and plan of care.    If you have questions, please do not hesitate to call me. I look forward to following Mila along with you.         Sincerely,        Dawn Herr MD        CC: MODE Lafleur Sarah, MD  03/05/25 1935  [facility]     Referring Provider: No ref. provider found     Chief complaint:  Left nipple abnormality     Subjective  9/11/24 HPI: Ms. Mila Lechuga is a 24 y.o. yo woman, seen at the request of No ref. provider found, for a new diagnosis of left nipple abnormality.      Approximately 2 years ago she noted a change to her left lateral nipple.  There is an area of redness and skin ulceration in that area.  She saw multiple providers and was provided several rounds of antibiotics as well as antibiotic ointment.  There was no resolution of her symptoms.  The ulcerated area on her left nipple areolar junction waxed and waned in severity.  Over the summer she has noticed ongoing worsening pain and drainage from her left lateral nipple base.  She has gone to the emergency room for evaluation and briefly followed with wound care.  She has been applying lanolin and Xeroform gauze dressing.     She reports a pertinent PMH significant for allergies, anemia, benign tachycardia, reflux.     She denies any family history of breast or ovarian cancer.     She was by herself in clinic today.       3/4/25 HPI  Returns to clinic today to discuss ongoing left nipple issues. Since our last visit she was seen at  where she underwent a two site punch biopsy of her left areola for ongoing nipple changes, episodes of cracking and drainage from cracks.  Since our last visit she has had aproximatly three episodes of symptoms when the nipple changes in shape and architecture, becomes painful. She has not attempted treatment with other medications. Was referred to allergy at . Noted to have signficant food allergies, concern for EoE. Recommended EGD not yet completed. Biopsues at  returned as inflammatory changes. Notes periods of skin resolution between flair ups.     No changes to her family history, no changes to her breast exam at home. Currently not having issues of her nipple or areola.    Review of Systems   Constitutional:  Positive for appetite change (Increase). Negative for fatigue and fever. Unexpected weight change: 2lbs in a month.  Eyes: Negative.    Respiratory:  Positive for shortness of breath. Negative for cough.    Cardiovascular: Negative.    Gastrointestinal:  Negative for abdominal distention, diarrhea and nausea.   Endocrine: Hot flashes: too cold.   Musculoskeletal:  Negative for arthralgias.   Skin: Negative.    Neurological: Negative.  Negative for dizziness, headaches and speech difficulty.   Hematological: Negative.    Psychiatric/Behavioral:  Negative for sleep disturbance. The patient is nervous/anxious.        Medications:    Current Outpatient Medications:   •  azelaic acid (AZELEX) 15 % gel, Apply 1 Application topically to the appropriate area as directed 2 (Two) Times a Day., Disp: , Rfl:   •  famotidine (PEPCID) 10 MG tablet, Take 2 tablets by mouth Daily., Disp: , Rfl:   •  fluticasone (VERAMYST) 27.5 MCG/SPRAY nasal spray, Administer 2 sprays into the nostril(s) as directed by provider Daily., Disp: , Rfl:   •  Haloette 0.12-0.015 MG/24HR vaginal ring, Insert 1 each into the vagina Every 28 (Twenty-Eight) Days., Disp: , Rfl:   •  Hydrocortisone, Perianal, (ANUSOL-HC) 2.5 % rectal cream, Insert 1 Application into the rectum 2 (Two) Times a Day., Disp: , Rfl:   •  mupirocin (BACTROBAN) 2 % ointment, Apply 1 Application  topically to the appropriate area as directed Daily. (Patient not taking: Reported on 12/3/2024), Disp: , Rfl:   •  pantoprazole (PROTONIX) 40 MG EC tablet, Take 1 tablet by mouth Daily., Disp: , Rfl:   •  polyethylene glycol (MIRALAX) 17 GM/SCOOP powder, Take 17 g by mouth 2 (Two) Times a Day., Disp: , Rfl:   •  propranolol (INDERAL) 20 MG tablet, Take 1 tablet by mouth 2 (Two) Times a Day., Disp: , Rfl:   •  Wheat Dextrin (Benefiber) powder, Take 1 each by mouth Daily., Disp: , Rfl:     Allergies:  Allergies   Allergen Reactions   • Apple Juice Itching and Swelling     Apples   • Cherry Itching and Swelling   • Other Itching and Swelling     [place] and Jackfruit   • Pear Itching and Swelling   • Prunus Persica Itching and Swelling   • Flavoring Agent (Non-Screening) Itching   • Soy Allergy (Obsolete) Itching   • Strawberry Itching   • Apple Unknown - Low Severity   • Tree Nut Other (See Comments)       Medical history:  Past Medical History:   Diagnosis Date   • Anemia    • Tachycardia    • TMJ (dislocation of temporomandibular joint)        Surgical History:  Past Surgical History:   Procedure Laterality Date   • CYST REMOVAL         Family History:  No family history on file.    Cancer Family History: Age of diagnosis/Age at death OR Age as of today  Breast Cancer: Denies  Ovarian Cancer: Denies  Pancreatic Cancer: Denies  Prostate Cancer: Denies  Colon Cancer: Denies  Lung Cancer: Denies    Social History:   Social History     Socioeconomic History   • Marital status: Single   Tobacco Use   • Smoking status: Never   • Smokeless tobacco: Never   Vaping Use   • Vaping status: Never Used   Substance and Sexual Activity   • Drug use: Never       She lives with her roommate, visits her family in [place] on most weekends.   She is a full time student.        GYNECOLOGIC HISTORY:   . P: 0. AB: 0.  Last menstrual period: 2024  Age at menarche: 12  Age at first childbirth: N/A  Lactation: N/A  Age at  menopause: N/A  Total years of oral contraceptive use: Couple months   Total years of hormone replacement therapy: 0      Objective  /58 (BP Location: Left arm, Patient Position: Sitting, Cuff Size: Adult)   Pulse 108   SpO2 98%   ECOG 0 - Asymptomatic  General: NAD, well appearing, thin build  Psych: a&o x 3, normal mood and affect  Eyes: EOMI, no scleral icterus  ENMT: neck supple without masses or thyromegaly, mucus membranes moist  Resp: normal effort  CV: RRR, no edema   GI: soft, NT, ND  MSK: normal gait, normal ROM in bilateral shoulders  Lymph nodes:  no cervical, supraclavicular or axillary lymphadenopathy  Breast: symmetric, small breast volume bilaterally  Right:  No visible abnormalities on inspection while seated, with arms raised or hands on hips. No masses, skin changes, or nipple abnormalities.  Left: No visible abnormalities on inspection while seated, with arms raised or hands on hips. No masses, skin changes.  Previous area - Junction between nipple and areolar complex with crusting clear serous drainage - now resolved, well healed two site punch biopsies.        Imaging - documented images below have been personally reviewed:  8/2/2024 left breast ultrasound-[facility]  Focused ultrasound performed at the subareolar region around the clock demonstrates breast parenchyma without suspicious cystic or solid mass. No organized fluid collection. Comparison sonographic evaluation of the contralateral (right) nipple shows no significant disparity in tissue echogenicity. The left nipple is slightly more prominent when compared with the right and demonstrates some vascularity which could be related to infection/inflammation.   BI-RADS 3.     11/11/24 Left breast US   Findings: Targeted ultrasound was performed in the left breast subareolar regions. No suspicious masses or cystic lesion is identified. This is consistent with the clinic history of resolution.   BI-RADS: BI-RADS 1, Negative.    RECOMMENDATIONS: Clinical Correlation and Management     Pathology:  9/25/25 left areola punch biopsies ()  NONDIAGNOSTIC INFLAMMATORY REACTION.     Assessment & Plan  Assessment:  24 y.o. F with a new diagnosis of left nipple abnormalities    Discussion:  Recommended punch biopsy at last visit but was deferred due to school schedule. Completed biopsies at . Symptoms are relapsing and remitting. Results with inflammation and her personal history of eczema - points to a focal skin issue over an underlying breast cancer.     Plan:    - continue self breast exams  - she has a dermatologist in [place] she has been established with previously - recommended an appointment with them or can look for a new appointment with a dermatologist in [place]. However expected long wait times due to demand.   - has steroid cream at home, ok to try lower dose of steroid cream if flair returns.   - recommend follow up with [facility] as needed as they have completed the majority of her work up with imaging and biopsies. Agree that work up has been thorough and data available points to a skin issue.   - RTC if a new issue arises or unable to reach her dermatologist for appointment.       Dawn Herr MD  Breast Surgical Oncology    I spent 45 minutes caring for Mila on this date of service. This time includes time spent by me in the following activities: preparing for the visit, reviewing tests, obtaining and/or reviewing a separately obtained history, performing a medically appropriate examination and/or evaluation , counseling and educating the patient/family/caregiver, referring and communicating with other health care professionals , documenting information in the medical record, independently interpreting results and communicating that information with the patient/family/caregiver, and care coordination.      CC:  No ref. provider found  Zulay Mac MD Pleiss, melanie       No follow-ups on  file.      Dawn Herr MD  03/05/25 1919  Sign when Signing Visit  [facility]     Referring Provider: No ref. provider found     Chief complaint:  Left nipple abnormality     Subjective  HPI: Ms. Mila Lechuga is a 24 y.o. yo woman, seen at the request of No ref. provider found, for a new diagnosis of left nipple abnormality.      Approximately 2 years ago she noted a change to her left lateral nipple.  There is an area of redness and skin ulceration in that area.  She saw multiple providers and was provided several rounds of antibiotics as well as antibiotic ointment.  There was no resolution of her symptoms.  The ulcerated area on her left nipple areolar junction waxed and waned in severity.  Over the summer she has noticed ongoing worsening pain and drainage from her left lateral nipple base.  She has gone to the emergency room for evaluation and briefly followed with wound care.  She has been applying lanolin and Xeroform gauze dressing.     She reports a pertinent PMH significant for allergies, anemia, benign tachycardia, reflux.     She denies any family history of breast or ovarian cancer.     She was by herself in clinic today.       Review of Systems   Constitutional:  Positive for appetite change (Increase). Negative for fatigue and fever. Unexpected weight change: 2lbs in a month.  Eyes: Negative.    Respiratory:  Positive for shortness of breath. Negative for cough.    Cardiovascular: Negative.    Gastrointestinal:  Negative for abdominal distention, diarrhea and nausea.   Endocrine: Hot flashes: too cold.   Musculoskeletal:  Negative for arthralgias.   Skin: Negative.    Neurological: Negative.  Negative for dizziness, headaches and speech difficulty.   Hematological: Negative.    Psychiatric/Behavioral:  Negative for sleep disturbance. The patient is nervous/anxious.        Medications:    Current Outpatient Medications:   •  azelaic acid (AZELEX) 15 % gel, Apply 1 Application topically to the  appropriate area as directed 2 (Two) Times a Day., Disp: , Rfl:   •  famotidine (PEPCID) 10 MG tablet, Take 2 tablets by mouth Daily., Disp: , Rfl:   •  fluticasone (VERAMYST) 27.5 MCG/SPRAY nasal spray, Administer 2 sprays into the nostril(s) as directed by provider Daily., Disp: , Rfl:   •  Haloette 0.12-0.015 MG/24HR vaginal ring, Insert 1 each into the vagina Every 28 (Twenty-Eight) Days., Disp: , Rfl:   •  Hydrocortisone, Perianal, (ANUSOL-HC) 2.5 % rectal cream, Insert 1 Application into the rectum 2 (Two) Times a Day., Disp: , Rfl:   •  mupirocin (BACTROBAN) 2 % ointment, Apply 1 Application topically to the appropriate area as directed Daily. (Patient not taking: Reported on 12/3/2024), Disp: , Rfl:   •  pantoprazole (PROTONIX) 40 MG EC tablet, Take 1 tablet by mouth Daily., Disp: , Rfl:   •  polyethylene glycol (MIRALAX) 17 GM/SCOOP powder, Take 17 g by mouth 2 (Two) Times a Day., Disp: , Rfl:   •  propranolol (INDERAL) 20 MG tablet, Take 1 tablet by mouth 2 (Two) Times a Day., Disp: , Rfl:   •  Wheat Dextrin (Benefiber) powder, Take 1 each by mouth Daily., Disp: , Rfl:     Allergies:  Allergies   Allergen Reactions   • Apple Juice Itching and Swelling     Apples   • Cherry Itching and Swelling   • Other Itching and Swelling     [place] and Jackfruit   • Pear Itching and Swelling   • Prunus Persica Itching and Swelling   • Flavoring Agent (Non-Screening) Itching   • Soy Allergy (Obsolete) Itching   • Strawberry Itching   • Apple Unknown - Low Severity   • Tree Nut Other (See Comments)       Medical history:  Past Medical History:   Diagnosis Date   • Anemia    • Tachycardia    • TMJ (dislocation of temporomandibular joint)        Surgical History:  Past Surgical History:   Procedure Laterality Date   • CYST REMOVAL         Family History:  No family history on file.    Cancer Family History: Age of diagnosis/Age at death OR Age as of today  Breast Cancer: Denies  Ovarian Cancer: Denies  Pancreatic Cancer:  Denies  Prostate Cancer: Denies  Colon Cancer: Denies  Lung Cancer: Denies    Social History:   Social History     Socioeconomic History   • Marital status: Single   Tobacco Use   • Smoking status: Never   • Smokeless tobacco: Never   Vaping Use   • Vaping status: Never Used   Substance and Sexual Activity   • Drug use: Never       She lives with her roommate.   She is a full time student.        GYNECOLOGIC HISTORY:   . P: 0. AB: 0.  Last menstrual period: 2024  Age at menarche: 12  Age at first childbirth: N/A  Lactation: N/A  Age at menopause: N/A  Total years of oral contraceptive use: Couple months   Total years of hormone replacement therapy: 0      Objective  PHYSICAL EXAMINATION  This exam was chaperoned by: Apolonia  /58 (BP Location: Left arm, Patient Position: Sitting, Cuff Size: Adult)   Pulse 108   SpO2 98%   ECOG 0 - Asymptomatic  General: NAD, well appearing, thin build  Psych: a&o x 3, normal mood and affect  Eyes: EOMI, no scleral icterus  ENMT: neck supple without masses or thyromegaly, mucus membranes moist  Resp: normal effort  CV: RRR, no edema   GI: soft, NT, ND  MSK: normal gait, normal ROM in bilateral shoulders  Lymph nodes:  no cervical, supraclavicular or axillary lymphadenopathy  Breast: symmetric, small breast volume bilaterally  Right:  No visible abnormalities on inspection while seated, with arms raised or hands on hips. No masses, skin changes, or nipple abnormalities.  Left: No visible abnormalities on inspection while seated, with arms raised or hands on hips. No masses, skin changes.  Nipple papilla is larger than the right and inflamed.  Has lateral redness along the nipple.  Junction between nipple and areolar complex with crusting clear serous drainage.  No visualized granulation tissue or wound that is visible.        Imaging - documented images below have been personally reviewed:  2024 left breast ultrasound-[facility]  Focused ultrasound performed at the  subareolar region around the clock demonstrates breast parenchyma without suspicious cystic or solid mass. No organized fluid collection. Comparison sonographic evaluation of the contralateral (right) nipple shows no significant disparity in tissue echogenicity. The left nipple is slightly more prominent when compared with the right and demonstrates some vascularity which could be related to infection/inflammation.   BI-RADS 3.     Pathology:  na    Assessment & Plan  Assessment:  24 y.o. F with a new diagnosis of left nipple    Discussion:  Breast imaging thus far with ultrasound has not identified an underlying etiology of her left nipple changes.    Plan:    -Recommend completion workup of her left nipple changes with breast MRI.  If breast MRI does not show any underlying abnormalities would recommend punch biopsy of the skin.  - Offered to complete this procedure during this visit however patient has a test tomorrow and declined biopsy today.  - Return to clinic after imaging complete or in 4 weeks to discuss need or role of punch biopsy      Dawn Herr MD  Breast Surgical Oncology    I spent 45 minutes caring for Mila on this date of service. This time includes time spent by me in the following activities: preparing for the visit, reviewing tests, obtaining and/or reviewing a separately obtained history, performing a medically appropriate examination and/or evaluation , counseling and educating the patient/family/caregiver, ordering medications, tests, or procedures, referring and communicating with other health care professionals , documenting information in the medical record, independently interpreting results and communicating that information with the patient/family/caregiver, and care coordination.      CC:  No ref. provider found  Zulay Mac MD

## 2025-03-06 NOTE — PROGRESS NOTES
[facility]     Referring Provider: No ref. provider found     Chief complaint:  Left nipple abnormality     Subjective   9/11/24 HPI: Ms. Mila Lechuga is a 24 y.o. yo woman, seen at the request of No ref. provider found, for a new diagnosis of left nipple abnormality.      Approximately 2 years ago she noted a change to her left lateral nipple.  There is an area of redness and skin ulceration in that area.  She saw multiple providers and was provided several rounds of antibiotics as well as antibiotic ointment.  There was no resolution of her symptoms.  The ulcerated area on her left nipple areolar junction waxed and waned in severity.  Over the summer she has noticed ongoing worsening pain and drainage from her left lateral nipple base.  She has gone to the emergency room for evaluation and briefly followed with wound care.  She has been applying lanolin and Xeroform gauze dressing.     She reports a pertinent PMH significant for allergies, anemia, benign tachycardia, reflux.     She denies any family history of breast or ovarian cancer.     She was by herself in clinic today.       3/4/25 HPI  Returns to clinic today to discuss ongoing left nipple issues. Since our last visit she was seen at  where she underwent a two site punch biopsy of her left areola for ongoing nipple changes, episodes of cracking and drainage from cracks. Since our last visit she has had aproximatly three episodes of symptoms when the nipple changes in shape and architecture, becomes painful. She has not attempted treatment with other medications. Was referred to allergy at . Noted to have signficant food allergies, concern for EoE. Recommended EGD not yet completed. Biopsues at  returned as inflammatory changes. Notes periods of skin resolution between flair ups.     No changes to her family history, no changes to her breast exam at home. Currently not having issues of her nipple or areola.    Review of Systems   Constitutional:   Positive for appetite change (Increase). Negative for fatigue and fever. Unexpected weight change: 2lbs in a month.  Eyes: Negative.    Respiratory:  Positive for shortness of breath. Negative for cough.    Cardiovascular: Negative.    Gastrointestinal:  Negative for abdominal distention, diarrhea and nausea.   Endocrine: Hot flashes: too cold.   Musculoskeletal:  Negative for arthralgias.   Skin: Negative.    Neurological: Negative.  Negative for dizziness, headaches and speech difficulty.   Hematological: Negative.    Psychiatric/Behavioral:  Negative for sleep disturbance. The patient is nervous/anxious.        Medications:    Current Outpatient Medications:     azelaic acid (AZELEX) 15 % gel, Apply 1 Application topically to the appropriate area as directed 2 (Two) Times a Day., Disp: , Rfl:     famotidine (PEPCID) 10 MG tablet, Take 2 tablets by mouth Daily., Disp: , Rfl:     fluticasone (VERAMYST) 27.5 MCG/SPRAY nasal spray, Administer 2 sprays into the nostril(s) as directed by provider Daily., Disp: , Rfl:     Haloette 0.12-0.015 MG/24HR vaginal ring, Insert 1 each into the vagina Every 28 (Twenty-Eight) Days., Disp: , Rfl:     Hydrocortisone, Perianal, (ANUSOL-HC) 2.5 % rectal cream, Insert 1 Application into the rectum 2 (Two) Times a Day., Disp: , Rfl:     mupirocin (BACTROBAN) 2 % ointment, Apply 1 Application topically to the appropriate area as directed Daily. (Patient not taking: Reported on 12/3/2024), Disp: , Rfl:     pantoprazole (PROTONIX) 40 MG EC tablet, Take 1 tablet by mouth Daily., Disp: , Rfl:     polyethylene glycol (MIRALAX) 17 GM/SCOOP powder, Take 17 g by mouth 2 (Two) Times a Day., Disp: , Rfl:     propranolol (INDERAL) 20 MG tablet, Take 1 tablet by mouth 2 (Two) Times a Day., Disp: , Rfl:     Wheat Dextrin (Benefiber) powder, Take 1 each by mouth Daily., Disp: , Rfl:     Allergies:  Allergies   Allergen Reactions    Apple Juice Itching and Swelling     Apples    Cherry Itching and  Swelling    Other Itching and Swelling     [place] and Jackfruit    Pear Itching and Swelling    Prunus Persica Itching and Swelling    Flavoring Agent (Non-Screening) Itching    Soy Allergy (Obsolete) Itching    Strawberry Itching    Apple Unknown - Low Severity    Tree Nut Other (See Comments)       Medical history:  Past Medical History:   Diagnosis Date    Anemia     Tachycardia     TMJ (dislocation of temporomandibular joint)        Surgical History:  Past Surgical History:   Procedure Laterality Date    CYST REMOVAL         Family History:  History reviewed. No pertinent family history.    Cancer Family History: Age of diagnosis/Age at death OR Age as of today  Breast Cancer: Denies  Ovarian Cancer: Denies  Pancreatic Cancer: Denies  Prostate Cancer: Denies  Colon Cancer: Denies  Lung Cancer: Denies    Social History:   Social History     Socioeconomic History    Marital status: Single   Tobacco Use    Smoking status: Never    Smokeless tobacco: Never   Vaping Use    Vaping status: Never Used   Substance and Sexual Activity    Drug use: Never       She lives with her roommate, visits her family in [place] on most weekends.   She is a full time student.        GYNECOLOGIC HISTORY:   . P: 0. AB: 0.  Last menstrual period: 2024  Age at menarche: 12  Age at first childbirth: N/A  Lactation: N/A  Age at menopause: N/A  Total years of oral contraceptive use: Couple months   Total years of hormone replacement therapy: 0      Objective   /58 (BP Location: Left arm, Patient Position: Sitting, Cuff Size: Adult)   Pulse 108   SpO2 98%   ECOG 0 - Asymptomatic  General: NAD, well appearing, thin build  Psych: a&o x 3, normal mood and affect  Eyes: EOMI, no scleral icterus  ENMT: neck supple without masses or thyromegaly, mucus membranes moist  Resp: normal effort  CV: RRR, no edema   GI: soft, NT, ND  MSK: normal gait, normal ROM in bilateral shoulders  Lymph nodes:  no cervical, supraclavicular or  axillary lymphadenopathy  Breast: symmetric, small breast volume bilaterally  Right:  No visible abnormalities on inspection while seated, with arms raised or hands on hips. No masses, skin changes, or nipple abnormalities.  Left: No visible abnormalities on inspection while seated, with arms raised or hands on hips. No masses, skin changes.  Previous area - Junction between nipple and areolar complex with crusting clear serous drainage - now resolved, well healed two site punch biopsies.        Imaging - documented images below have been personally reviewed:  8/2/2024 left breast ultrasound-[facility]  Focused ultrasound performed at the subareolar region around the clock demonstrates breast parenchyma without suspicious cystic or solid mass. No organized fluid collection. Comparison sonographic evaluation of the contralateral (right) nipple shows no significant disparity in tissue echogenicity. The left nipple is slightly more prominent when compared with the right and demonstrates some vascularity which could be related to infection/inflammation.   BI-RADS 3.     11/11/24 Left breast US   Findings: Targeted ultrasound was performed in the left breast subareolar regions. No suspicious masses or cystic lesion is identified. This is consistent with the clinic history of resolution.   BI-RADS: BI-RADS 1, Negative.   RECOMMENDATIONS: Clinical Correlation and Management     Pathology:  9/25/25 left areola punch biopsies ()  NONDIAGNOSTIC INFLAMMATORY REACTION.     Assessment & Plan   Assessment:  24 y.o. F with a new diagnosis of left nipple abnormalities    Discussion:  Recommended punch biopsy at last visit but was deferred due to school schedule. Completed biopsies at . Symptoms are relapsing and remitting. Results with inflammation and her personal history of eczema - points to a focal skin issue over an underlying breast cancer.     Plan:    - continue self breast exams  - she has a dermatologist in  [place] she has been established with previously - recommended an appointment with them or can look for a new appointment with a dermatologist in [place]. However expected long wait times due to demand.   - has steroid cream at home, ok to try lower dose of steroid cream if flair returns.   - recommend follow up with [facility] as needed as they have completed the majority of her work up with imaging and biopsies. Agree that work up has been thorough and data available points to a skin issue.   - RTC if a new issue arises or unable to reach her dermatologist for appointment.       Dawn Herr MD  Breast Surgical Oncology    I spent 45 minutes caring for Mila on this date of service. This time includes time spent by me in the following activities: preparing for the visit, reviewing tests, obtaining and/or reviewing a separately obtained history, performing a medically appropriate examination and/or evaluation , counseling and educating the patient/family/caregiver, referring and communicating with other health care professionals , documenting information in the medical record, independently interpreting results and communicating that information with the patient/family/caregiver, and care coordination.      CC:  No ref. provider found  Zulay Mac MD Pleiss, melanie       Return if symptoms worsen or fail to improve. continue follow up with [facility].

## 2025-05-05 ENCOUNTER — OFFICE VISIT (OUTPATIENT)
Dept: ENDOCRINOLOGY | Age: 25
End: 2025-05-05
Payer: MEDICAID

## 2025-05-05 ENCOUNTER — TELEPHONE (OUTPATIENT)
Dept: ENDOCRINOLOGY | Age: 25
End: 2025-05-05

## 2025-05-05 VITALS
HEART RATE: 107 BPM | SYSTOLIC BLOOD PRESSURE: 118 MMHG | TEMPERATURE: 97.9 F | WEIGHT: 98.2 LBS | DIASTOLIC BLOOD PRESSURE: 72 MMHG | BODY MASS INDEX: 19.82 KG/M2 | OXYGEN SATURATION: 100 %

## 2025-05-05 DIAGNOSIS — R79.89 PROLACTIN INCREASED: Primary | ICD-10-CM

## 2025-05-05 PROCEDURE — 99214 OFFICE O/P EST MOD 30 MIN: CPT | Performed by: INTERNAL MEDICINE

## 2025-05-05 PROCEDURE — 1160F RVW MEDS BY RX/DR IN RCRD: CPT | Performed by: INTERNAL MEDICINE

## 2025-05-05 PROCEDURE — 1159F MED LIST DOCD IN RCRD: CPT | Performed by: INTERNAL MEDICINE

## 2025-05-05 RX ORDER — ATOMOXETINE 18 MG/1
CAPSULE ORAL
COMMUNITY
Start: 2025-03-03

## 2025-05-05 RX ORDER — CHLORHEXIDINE GLUCONATE ORAL RINSE 1.2 MG/ML
15 SOLUTION DENTAL
COMMUNITY
Start: 2025-04-25 | End: 2025-05-09

## 2025-05-05 NOTE — PROGRESS NOTES
Chief complaint   Chief Complaint   Patient presents with    Prolactin increased          Subjective     History of Present Illness:      This is a 24 years old female I am seeing for high prolactin   referred by PCP   Last OV was > 4 months ago   She is here for a follow up , missed her last visit   other medical issues   1- GERD   2- Anemia   3- Other   Pt was Dx with high prolactin  many years ago , Dx was at age of 17  Is not on prolactin lowering medication now  , was on Cabergoline on and off for last 6 years  had blood work done by PCP, 5/2024  TSH was 2.2, prolactin was 34 in 8-2024    Last MRI of the pit was in 2021 that showed Heterogeneously enhancing pituitary gland without convincing adenoma. Previously described tiny lesion in the left gland appears resolved.   pt states that she has no headaches, No visual changes, has breast secretions on the left side  yellow and periods are regular and no plans for pregnancy , no history head and Neck radiationNo History of pituitary surgery, No family history of pituitary disease, No prior history of pituitary Dysfunction  and has No dysphagia, No dyspnea, No dysphonia, No change size of neck, No neck pain or discomfort, No nervousness, No shakiness, No palpitations, Weight stable   BM daily, No diarrhea, No constipation, No edema, No proximal muscle weak, No Cold or Heat Intolerance, No Insomnia, No hair Loss, No Skin Drynress, Appetite is OK   Latest Reference Range & Units 12/03/24 16:01   Insulin-Like Growth Factor-1 101 - 347 ng/mL 193   Prolactin 4.8 - 33.4 ng/mL 27.9      Latest Reference Range & Units 10/03/24 15:52   Prolactin 4.8 - 33.4 ng/mL 5.6             History reviewed. No pertinent family history.  Social History     Socioeconomic History    Marital status: Single   Tobacco Use    Smoking status: Never    Smokeless tobacco: Never   Vaping Use    Vaping status: Never Used   Substance and Sexual Activity    Drug use: Never     Past Medical  History:   Diagnosis Date    Anemia     Tachycardia     TMJ (dislocation of temporomandibular joint)      Past Surgical History:   Procedure Laterality Date    CYST REMOVAL         Current Outpatient Medications:     azelaic acid (AZELEX) 15 % gel, Apply 1 Application topically to the appropriate area as directed 2 (Two) Times a Day., Disp: , Rfl:     famotidine (PEPCID) 10 MG tablet, Take 2 tablets by mouth Daily., Disp: , Rfl:     fluticasone (VERAMYST) 27.5 MCG/SPRAY nasal spray, Administer 2 sprays into the nostril(s) as directed by provider Daily., Disp: , Rfl:     Haloette 0.12-0.015 MG/24HR vaginal ring, Insert 1 each into the vagina Every 28 (Twenty-Eight) Days., Disp: , Rfl:     Hydrocortisone, Perianal, (ANUSOL-HC) 2.5 % rectal cream, Insert 1 Application into the rectum 2 (Two) Times a Day., Disp: , Rfl:     mupirocin (BACTROBAN) 2 % ointment, Apply 1 Application topically to the appropriate area as directed Daily. (Patient not taking: Reported on 12/3/2024), Disp: , Rfl:     pantoprazole (PROTONIX) 40 MG EC tablet, Take 1 tablet by mouth Daily., Disp: , Rfl:     polyethylene glycol (MIRALAX) 17 GM/SCOOP powder, Take 17 g by mouth 2 (Two) Times a Day., Disp: , Rfl:     propranolol (INDERAL) 20 MG tablet, Take 1 tablet by mouth 2 (Two) Times a Day., Disp: , Rfl:     Wheat Dextrin (Benefiber) powder, Take 1 each by mouth Daily., Disp: , Rfl:   Apple juice, Cherry, Other, Pear, Prunus persica, Flavoring agent (non-screening), Soy allergy (obsolete), Strawberry, Apple, and Tree nut    Objective   There were no vitals filed for this visit.         Physical Exam  Neurological:      General: No focal deficit present.      Mental Status: She is alert and oriented to person, place, and time.               Diagnoses and all orders for this visit:    1. Prolactin increased (Primary)           Assessment:     This is a 24 years old female I am seeing for high prolactin, referred by PCP   Pt was Dx with high prolactin   many years ago   Is not on prolactin lowering medication now    had blood work done by PCP, 5/2024  TSH was 2.2, prolactin was 34 in 8-2024   had blood work done by me, =12 /2024  prolactin was 27   Last MRI of the pit was in 2021 that showed Heterogeneously enhancing pituitary gland without convincing adenoma. Previously described tiny lesion in the left gland appears resolved. Will reevaluate       Plan:    1. No treatment now as last levels were normal   Was told that one side secretion of the breast might not be related to prolactin   2. Labs today :Pregnancy test, Prolactin    3. If Prolactin is high will get an MRI of the pituitary gland  4. Return in 12 months   5. Was informed that will start Cabergoline if levels are elevated   6. Labs reviewed with the Patient : TSH   7- I reviewed the notes from PCP   8- Needs to call if she gets pregnant as we talked about pregnancy and prolactin         I discussed with the patient/legal representative the risks and the benefits associated with the medications.  The patient/legal representative has been given the opportunity to ask questions.  Alternatives to the proposed treatment (s) were discussed, including the likely results of no treatment.  The patient/legal representative wishes to proceed.       Pedro Grant MD   05/05/25  15:22 EDT

## 2025-05-06 LAB
B-HCG SERPL QL: NEGATIVE
PROLACTIN SERPL-MCNC: 23.7 NG/ML (ref 4.8–33.4)